# Patient Record
Sex: FEMALE | Race: WHITE | NOT HISPANIC OR LATINO | Employment: FULL TIME | URBAN - METROPOLITAN AREA
[De-identification: names, ages, dates, MRNs, and addresses within clinical notes are randomized per-mention and may not be internally consistent; named-entity substitution may affect disease eponyms.]

---

## 2019-03-25 ENCOUNTER — APPOINTMENT (OUTPATIENT)
Dept: RADIOLOGY | Facility: MEDICAL CENTER | Age: 34
End: 2019-03-25
Attending: EMERGENCY MEDICINE
Payer: COMMERCIAL

## 2019-03-25 ENCOUNTER — HOSPITAL ENCOUNTER (EMERGENCY)
Facility: MEDICAL CENTER | Age: 34
End: 2019-03-25
Attending: EMERGENCY MEDICINE
Payer: COMMERCIAL

## 2019-03-25 VITALS
OXYGEN SATURATION: 98 % | HEART RATE: 84 BPM | RESPIRATION RATE: 16 BRPM | WEIGHT: 250.22 LBS | TEMPERATURE: 98.4 F | DIASTOLIC BLOOD PRESSURE: 70 MMHG | SYSTOLIC BLOOD PRESSURE: 125 MMHG | BODY MASS INDEX: 39.19 KG/M2

## 2019-03-25 DIAGNOSIS — R51.9 ACUTE INTRACTABLE HEADACHE, UNSPECIFIED HEADACHE TYPE: ICD-10-CM

## 2019-03-25 PROCEDURE — 99284 EMERGENCY DEPT VISIT MOD MDM: CPT

## 2019-03-25 PROCEDURE — 70450 CT HEAD/BRAIN W/O DYE: CPT

## 2019-03-25 PROCEDURE — 96374 THER/PROPH/DIAG INJ IV PUSH: CPT

## 2019-03-25 PROCEDURE — 96375 TX/PRO/DX INJ NEW DRUG ADDON: CPT

## 2019-03-25 PROCEDURE — 700111 HCHG RX REV CODE 636 W/ 250 OVERRIDE (IP): Performed by: EMERGENCY MEDICINE

## 2019-03-25 RX ORDER — DIPHENHYDRAMINE HYDROCHLORIDE 50 MG/ML
12.5 INJECTION INTRAMUSCULAR; INTRAVENOUS ONCE
Status: COMPLETED | OUTPATIENT
Start: 2019-03-25 | End: 2019-03-25

## 2019-03-25 RX ORDER — PROMETHAZINE HYDROCHLORIDE 25 MG/1
25 SUPPOSITORY RECTAL EVERY 6 HOURS PRN
Qty: 15 SUPPOSITORY | Refills: 0 | Status: SHIPPED | OUTPATIENT
Start: 2019-03-25 | End: 2021-05-24

## 2019-03-25 RX ORDER — METOCLOPRAMIDE HYDROCHLORIDE 5 MG/ML
10 INJECTION INTRAMUSCULAR; INTRAVENOUS ONCE
Status: COMPLETED | OUTPATIENT
Start: 2019-03-25 | End: 2019-03-25

## 2019-03-25 RX ORDER — MECLIZINE HYDROCHLORIDE 25 MG/1
25 TABLET ORAL 3 TIMES DAILY PRN
Qty: 30 TAB | Refills: 0 | Status: SHIPPED | OUTPATIENT
Start: 2019-03-25 | End: 2021-05-24

## 2019-03-25 RX ORDER — KETOROLAC TROMETHAMINE 30 MG/ML
30 INJECTION, SOLUTION INTRAMUSCULAR; INTRAVENOUS ONCE
Status: COMPLETED | OUTPATIENT
Start: 2019-03-25 | End: 2019-03-25

## 2019-03-25 RX ADMIN — KETOROLAC TROMETHAMINE 30 MG: 30 INJECTION, SOLUTION INTRAMUSCULAR; INTRAVENOUS at 12:15

## 2019-03-25 RX ADMIN — DIPHENHYDRAMINE HYDROCHLORIDE 12.5 MG: 50 INJECTION INTRAMUSCULAR; INTRAVENOUS at 12:15

## 2019-03-25 RX ADMIN — METOCLOPRAMIDE 10 MG: 5 INJECTION, SOLUTION INTRAMUSCULAR; INTRAVENOUS at 12:16

## 2019-03-25 ASSESSMENT — LIFESTYLE VARIABLES: DO YOU DRINK ALCOHOL: NO

## 2019-03-25 NOTE — ED PROVIDER NOTES
ED Provider Note    Scribed for Surinder Lema M.D. by Germain Self. 3/25/2019  11:58 AM    Primary care provider: Neo Basilio M.D.  Means of arrival: Private Vehicle.  History obtained from: Patient  History limited by: None    CHIEF COMPLAINT  Chief Complaint   Patient presents with   • Headache   • Nausea   • Dizziness     HPI  Mirella Gotti is a 33 y.o. female who presents to the Emergency Department complaining of a headache onset 2-3 weeks ago.  There is been no photophobia but she has had some dizziness and vertiginous symptoms.  The patient has associated nausea and dizziness that began 1-2 days ago.  Previously she was able to manage the headache with Excedrin.  Has not had a head scan in about 13 years but has had no headaches this severe denies any neck stiffness or vomiting.    REVIEW OF SYSTEMS  Pertinent negatives include no trauma. As above, all other systems reviewed and are negative.   See HPI for further details.     PAST MEDICAL HISTORY    None noted.    SURGICAL HISTORY   has a past surgical history that includes silverio by laparoscopy (2009).    SOCIAL HISTORY  Social History   Substance Use Topics   • Smoking status: Former Smoker   • Smokeless tobacco: Never Used   • Alcohol use No      History   Drug Use No     FAMILY HISTORY  Family History   Problem Relation Age of Onset   • Arthritis Mother         RA   • Cancer Father         skin    • Cancer Maternal Aunt         intestinal   • Cancer Maternal Grandmother         breast   • Cancer Other         espophageal    • Heart Disease Other      CURRENT MEDICATIONS  Home Medications    **Home medications have not yet been reviewed for this encounter**       ALLERGIES  Allergies   Allergen Reactions   • Bee    • No Known Drug Allergy        PHYSICAL EXAM  VITAL SIGNS: /82   Pulse 85   Temp 36.9 °C (98.4 °F) (Temporal)   Resp 16   Wt 113.5 kg (250 lb 3.6 oz)   LMP 03/22/2019   SpO2 98%   Breastfeeding? Unknown   BMI 39.19  kg/m²   Constitutional: Well developed, Well nourished, No acute distress, Non-toxic appearance.   HENT: Normocephalic, Atraumatic, Bilateral external ears normal, Oropharynx is clear mucous membranes are moist. No oral exudates or nasal discharge.   Eyes: Pupils are equal round and reactive, EOMI, Conjunctiva normal, No discharge.   Neck: Normal range of motion, No tenderness, Supple, No stridor. No meningismus.  Lymphatic: No lymphadenopathy noted.   Cardiovascular: Regular rate and rhythm without murmur rub or gallop.  Thorax & Lungs: Clear breath sounds bilaterally without wheezes, rhonchi or rales. There is no chest wall tenderness.   Abdomen: Soft non-tender non-distended. There is no rebound or guarding. No organomegaly is appreciated. Bowel sounds are normal.  Skin: Normal without rash.   Back: No CVA or spinal tenderness.   Extremities: Intact distal pulses, No edema, No tenderness, No cyanosis, No clubbing. Capillary refill is less than 2 seconds.  Musculoskeletal: Good range of motion in all major joints. No tenderness to palpation or major deformities noted.   Neurologic: Alert & oriented x 3, Normal motor function, Normal sensory function, No focal deficits noted. Reflexes are normal.  Psychiatric: Affect normal, Judgment normal, Mood normal. There is no suicidal ideation or patient reported hallucinations.     DIAGNOSTIC STUDIES / PROCEDURES    RADIOLOGY  CT-HEAD W/O   Final Result      Negative noncontrast CT scan of the head / brain.        The radiologist's interpretation of all radiological studies have been reviewed by me.    COURSE & MEDICAL DECISION MAKING  Nursing notes, VS, PMSFHx reviewed in chart.    11:58 AM Patient seen and examined at bedside. Ordered for CT head to evaluate. Patient was treated with 12.5 mg Benadryl, 30 mg Toradol, and 10 mg Reglan for her symptoms.  Patient agrees to proceed with the discussed plan of care.    I had a low suspicion for meningitis or subarachnoid  hemorrhage.  I was concerned about the fact that this is an unusual headache and the where she is ever had in the last 10 years.  Was concerned about possible mass and CT scan of the head shows no evidence of mass, bleed or swelling    Patient improved significantly with Benadryl, Toradol and Reglan.  She seems much better and I am discharging her with meclizine as well as Phenergan suppositories to be used as needed.  She will return of any significant change in symptoms    Patient has had high blood pressure while in the emergency department, felt likely secondary to medical condition. Counseled patient to monitor blood pressure at home and follow up with primary care physician.      The patient will return for new or worsening symptoms and is stable at the time of discharge.    Medications   diphenhydrAMINE (BENADRYL) injection 12.5 mg (12.5 mg Intravenous Given 3/25/19 1215)   ketorolac (TORADOL) injection 30 mg (30 mg Intravenous Given 3/25/19 1215)   metoclopramide (REGLAN) injection 10 mg (10 mg Intravenous Given 3/25/19 1216)      DISPOSITION:  Patient will be discharged home in stable condition.    FINAL IMPRESSION  1. Acute intractable headache, unspecified headache type          I, Germain Self (Scribe), am scribing for, and in the presence of, Surinder Lema M.D..    Electronically signed by: Germain Self (Scribe), 3/25/2019    ISurinder M.D. personally performed the services described in this documentation, as scribed by Germain Self in my presence, and it is both accurate and complete.  E.    The note accurately reflects work and decisions made by me.  Surinder Lema  3/25/2019  2:05 PM

## 2019-03-25 NOTE — ED TRIAGE NOTES
34 y/o female ambulatory to triage with c/o headache, nausea and dizziness. Pt states the headache began 2-3 weeks ago but she has been able to control the pain with Excedrin. Pt states that over the weekend she began to have nausea and experience dizziness.

## 2020-11-17 ENCOUNTER — HOSPITAL ENCOUNTER (OUTPATIENT)
Dept: LAB | Facility: MEDICAL CENTER | Age: 35
End: 2020-11-17
Attending: PHYSICIAN ASSISTANT
Payer: COMMERCIAL

## 2020-11-17 PROCEDURE — 87491 CHLMYD TRACH DNA AMP PROBE: CPT

## 2020-11-17 PROCEDURE — 87591 N.GONORRHOEAE DNA AMP PROB: CPT

## 2020-11-17 PROCEDURE — 88175 CYTOPATH C/V AUTO FLUID REDO: CPT

## 2020-11-17 PROCEDURE — 87624 HPV HI-RISK TYP POOLED RSLT: CPT

## 2020-11-20 LAB
C TRACH DNA GENITAL QL NAA+PROBE: NEGATIVE
CYTOLOGY REG CYTOL: NORMAL
HPV HR 12 DNA CVX QL NAA+PROBE: NEGATIVE
HPV16 DNA SPEC QL NAA+PROBE: NEGATIVE
HPV18 DNA SPEC QL NAA+PROBE: NEGATIVE
N GONORRHOEA DNA GENITAL QL NAA+PROBE: NEGATIVE
SPECIMEN SOURCE: NORMAL
SPECIMEN SOURCE: NORMAL

## 2021-05-24 ENCOUNTER — OFFICE VISIT (OUTPATIENT)
Dept: URGENT CARE | Facility: CLINIC | Age: 36
End: 2021-05-24
Payer: COMMERCIAL

## 2021-05-24 VITALS
TEMPERATURE: 98.9 F | HEIGHT: 67 IN | OXYGEN SATURATION: 99 % | SYSTOLIC BLOOD PRESSURE: 124 MMHG | DIASTOLIC BLOOD PRESSURE: 72 MMHG | BODY MASS INDEX: 40.97 KG/M2 | WEIGHT: 261 LBS | HEART RATE: 70 BPM | RESPIRATION RATE: 17 BRPM

## 2021-05-24 DIAGNOSIS — Z87.39 HISTORY OF BACK PAIN: ICD-10-CM

## 2021-05-24 DIAGNOSIS — M62.830 LUMBAR PARASPINAL MUSCLE SPASM: ICD-10-CM

## 2021-05-24 PROCEDURE — 99204 OFFICE O/P NEW MOD 45 MIN: CPT | Performed by: PHYSICIAN ASSISTANT

## 2021-05-24 RX ORDER — CYCLOBENZAPRINE HCL 10 MG
10 TABLET ORAL 3 TIMES DAILY PRN
Qty: 30 TABLET | Refills: 0 | Status: SHIPPED | OUTPATIENT
Start: 2021-05-24 | End: 2021-11-02

## 2021-05-24 RX ORDER — KETOROLAC TROMETHAMINE 30 MG/ML
30 INJECTION, SOLUTION INTRAMUSCULAR; INTRAVENOUS ONCE
Status: COMPLETED | OUTPATIENT
Start: 2021-05-24 | End: 2021-05-24

## 2021-05-24 RX ORDER — NAPROXEN 500 MG/1
500 TABLET ORAL 2 TIMES DAILY WITH MEALS
Qty: 60 TABLET | Refills: 0 | Status: SHIPPED | OUTPATIENT
Start: 2021-05-24 | End: 2021-11-02

## 2021-05-24 RX ORDER — NORETHINDRONE ACETATE AND ETHINYL ESTRADIOL, ETHINYL ESTRADIOL AND FERROUS FUMARATE 1MG-10(24)
KIT ORAL
COMMUNITY
Start: 2021-03-09 | End: 2021-11-02

## 2021-05-24 RX ADMIN — KETOROLAC TROMETHAMINE 30 MG: 30 INJECTION, SOLUTION INTRAMUSCULAR; INTRAVENOUS at 11:32

## 2021-05-24 ASSESSMENT — ENCOUNTER SYMPTOMS
FEVER: 0
CHILLS: 0
NAUSEA: 0
TINGLING: 0
FOCAL WEAKNESS: 0
BACK PAIN: 1
VOMITING: 0

## 2021-05-24 NOTE — PROGRESS NOTES
Subjective:     Mirella Gotti  is a 35 y.o. female who presents for Back Pain (lower back pain x  2 days , feels like a pinched nerve , hurts to walk or make sudden movement  , right side hip and right lower back )      Back Pain  Pertinent negatives include no fever or tingling.   Patient presents urgent care complaining of pain in the low back.  Patient notes right-sided low back pain as she got up from sitting on the floor at a basketball game on Saturday, 2 days ago.  She notes some mild improvement through the day on Saturday and then worsened pain that evening.  She complained of pain with ambulation.  She did try some Tylenol and Aleve with moderate improvement through the night allowing for good sleep.  Upon waking yesterday morning she had continued right side pain.  She notes mild radiation of discomfort wrapping around right hip.  Denies significant radiation down right leg.  Notes past medical history of similar pain historically.  She notes she did have an MRI for low back pain in 2005.  She states she has had few episodes of similar low back pain but denies history of trauma to the low back or surgery.  She notes family history of some low back arthritis requiring treatments.  She denies numbness tingling or weakness.  Denies saddle anesthesia or incontinence.  She denies fevers chills or rash.  She complains of continued pain but notes some improvement following Tylenol and Aleve.    Review of Systems   Constitutional: Negative for chills and fever.   Gastrointestinal: Negative for nausea and vomiting.   Musculoskeletal: Positive for back pain.   Skin: Negative for rash.   Neurological: Negative for tingling and focal weakness.       Medications:    • BEYAZ PO  • cyclobenzaprine  • fluticasone  • HYDROcodone-Acetaminophen Tabs  • ibuprofen Tabs  • meclizine Tabs  • promethazine Supp    Allergies: Bee and No known drug allergy    Problem List: Mirella Gotti has Labor and delivery  "indication for care or intervention on their problem list.    Surgical History:  Past Surgical History:   Procedure Laterality Date   • FIDE BY LAPAROSCOPY  2009       Past Social Hx: Mirella Gotti  reports that she has quit smoking. She has never used smokeless tobacco. She reports that she does not drink alcohol and does not use drugs.     Past Family Hx:  Mirella Gotti family history includes Arthritis in her mother; Cancer in her father, maternal aunt, maternal grandmother, and another family member; Heart Disease in an other family member.     Problem list, medications, and allergies reviewed by myself today in Epic.     Objective:   /72 (BP Location: Left arm, Patient Position: Sitting, BP Cuff Size: Adult)   Pulse 70   Temp 37.2 °C (98.9 °F) (Temporal)   Resp 17   Ht 1.702 m (5' 7\")   Wt 118 kg (261 lb)   SpO2 99%   BMI 40.88 kg/m²     Physical Exam  Vitals and nursing note reviewed.   Constitutional:       General: She is not in acute distress.     Appearance: She is well-developed. She is not diaphoretic.   HENT:      Head: Normocephalic and atraumatic.      Right Ear: External ear normal.      Left Ear: External ear normal.      Nose: Nose normal.   Eyes:      General: Lids are normal. No scleral icterus.        Right eye: No discharge.         Left eye: No discharge.      Conjunctiva/sclera: Conjunctivae normal.   Pulmonary:      Effort: Pulmonary effort is normal. No respiratory distress.   Musculoskeletal:      Cervical back: Neck supple.      Lumbar back: Spasms and tenderness ( primary paraspinous) present. No swelling, edema, deformity, lacerations or bony tenderness. Decreased range of motion ( 2/2 pain).   Skin:     General: Skin is warm and dry.      Coloration: Skin is not pale.      Findings: No erythema.   Neurological:      Mental Status: She is alert and oriented to person, place, and time. She is not disoriented.      Sensory: No sensory deficit.      Coordination: " Coordination normal.      Deep Tendon Reflexes: Reflexes are normal and symmetric.      Comments: SLR normal bilat   Psychiatric:         Speech: Speech normal.         Behavior: Behavior normal.     Toradol 30 mg IM-tolerates well    Assessment/Plan:   Assessment      1. Lumbar paraspinal muscle spasm  - ketorolac (TORADOL) injection 30 mg  - naproxen (NAPROSYN) 500 MG Tab; Take 1 tablet by mouth 2 times a day with meals.  Dispense: 60 tablet; Refill: 0  - cyclobenzaprine (FLEXERIL) 10 mg Tab; Take 1 tablet by mouth 3 times a day as needed.  Dispense: 30 tablet; Refill: 0    2. History of back pain  Recommend conservative care, rest, ice, heat, work on gentle ROM exercises, sent w/ stretches  Return to clinic with lack of resolution or progression of symptoms.  Cautioned regarding potential for sedation with medication.      I have worn an N95 mask, gloves and eye protection for the entire encounter with this patient.     Differential diagnosis, natural history, supportive care, and indications for immediate follow-up discussed.

## 2021-11-23 ENCOUNTER — HOSPITAL ENCOUNTER (OUTPATIENT)
Dept: LAB | Facility: MEDICAL CENTER | Age: 36
End: 2021-11-23
Attending: OBSTETRICS & GYNECOLOGY
Payer: COMMERCIAL

## 2021-11-23 LAB — CYTOLOGY REG CYTOL: NORMAL

## 2021-11-23 PROCEDURE — 88175 CYTOPATH C/V AUTO FLUID REDO: CPT

## 2022-01-14 ENCOUNTER — HOSPITAL ENCOUNTER (OUTPATIENT)
Dept: RADIOLOGY | Facility: MEDICAL CENTER | Age: 37
End: 2022-01-14
Attending: OBSTETRICS & GYNECOLOGY
Payer: COMMERCIAL

## 2022-01-14 DIAGNOSIS — N92.4 PREMENOPAUSAL MENORRHAGIA: ICD-10-CM

## 2022-01-14 PROCEDURE — 76830 TRANSVAGINAL US NON-OB: CPT

## 2022-02-08 ENCOUNTER — PRE-ADMISSION TESTING (OUTPATIENT)
Dept: ADMISSIONS | Facility: MEDICAL CENTER | Age: 37
End: 2022-02-08
Attending: OBSTETRICS & GYNECOLOGY
Payer: COMMERCIAL

## 2022-02-08 DIAGNOSIS — Z01.812 PRE-OPERATIVE LABORATORY EXAMINATION: ICD-10-CM

## 2022-02-08 LAB
ANION GAP SERPL CALC-SCNC: 11 MMOL/L (ref 7–16)
BUN SERPL-MCNC: 11 MG/DL (ref 8–22)
CALCIUM SERPL-MCNC: 9.8 MG/DL (ref 8.5–10.5)
CHLORIDE SERPL-SCNC: 107 MMOL/L (ref 96–112)
CO2 SERPL-SCNC: 19 MMOL/L (ref 20–33)
CREAT SERPL-MCNC: 0.69 MG/DL (ref 0.5–1.4)
ERYTHROCYTE [DISTWIDTH] IN BLOOD BY AUTOMATED COUNT: 42.3 FL (ref 35.9–50)
GLUCOSE SERPL-MCNC: 86 MG/DL (ref 65–99)
HCG SERPL QL: NEGATIVE
HCT VFR BLD AUTO: 43.5 % (ref 37–47)
HGB BLD-MCNC: 14.3 G/DL (ref 12–16)
MCH RBC QN AUTO: 28.9 PG (ref 27–33)
MCHC RBC AUTO-ENTMCNC: 32.9 G/DL (ref 33.6–35)
MCV RBC AUTO: 87.9 FL (ref 81.4–97.8)
PLATELET # BLD AUTO: 354 K/UL (ref 164–446)
PMV BLD AUTO: 10.2 FL (ref 9–12.9)
POTASSIUM SERPL-SCNC: 4 MMOL/L (ref 3.6–5.5)
RBC # BLD AUTO: 4.95 M/UL (ref 4.2–5.4)
SODIUM SERPL-SCNC: 137 MMOL/L (ref 135–145)
WBC # BLD AUTO: 11.6 K/UL (ref 4.8–10.8)

## 2022-02-08 PROCEDURE — 36415 COLL VENOUS BLD VENIPUNCTURE: CPT

## 2022-02-08 PROCEDURE — 80048 BASIC METABOLIC PNL TOTAL CA: CPT

## 2022-02-08 PROCEDURE — 85027 COMPLETE CBC AUTOMATED: CPT

## 2022-02-08 PROCEDURE — 84703 CHORIONIC GONADOTROPIN ASSAY: CPT

## 2022-02-08 RX ORDER — IBUPROFEN 800 MG/1
800 TABLET ORAL PRN
COMMUNITY

## 2022-02-17 ENCOUNTER — HOSPITAL ENCOUNTER (OUTPATIENT)
Dept: LAB | Facility: MEDICAL CENTER | Age: 37
End: 2022-02-17
Attending: OBSTETRICS & GYNECOLOGY
Payer: COMMERCIAL

## 2022-02-17 LAB — PATHOLOGY CONSULT NOTE: NORMAL

## 2022-02-17 PROCEDURE — 88305 TISSUE EXAM BY PATHOLOGIST: CPT

## 2022-02-21 ENCOUNTER — ANESTHESIA EVENT (OUTPATIENT)
Dept: SURGERY | Facility: MEDICAL CENTER | Age: 37
End: 2022-02-21
Payer: COMMERCIAL

## 2022-02-22 ENCOUNTER — HOSPITAL ENCOUNTER (OUTPATIENT)
Facility: MEDICAL CENTER | Age: 37
End: 2022-02-22
Attending: OBSTETRICS & GYNECOLOGY | Admitting: OBSTETRICS & GYNECOLOGY
Payer: COMMERCIAL

## 2022-02-22 ENCOUNTER — ANESTHESIA (OUTPATIENT)
Dept: SURGERY | Facility: MEDICAL CENTER | Age: 37
End: 2022-02-22
Payer: COMMERCIAL

## 2022-02-22 VITALS
OXYGEN SATURATION: 98 % | TEMPERATURE: 99.3 F | HEART RATE: 80 BPM | HEIGHT: 67 IN | SYSTOLIC BLOOD PRESSURE: 140 MMHG | DIASTOLIC BLOOD PRESSURE: 58 MMHG | RESPIRATION RATE: 16 BRPM | BODY MASS INDEX: 40 KG/M2 | WEIGHT: 254.85 LBS

## 2022-02-22 PROBLEM — Z30.2 REQUEST FOR STERILIZATION: Status: ACTIVE | Noted: 2022-02-22

## 2022-02-22 PROBLEM — E66.9 OBESITY (BMI 30-39.9): Status: ACTIVE | Noted: 2022-02-22

## 2022-02-22 LAB
EXTERNAL QUALITY CONTROL: NORMAL
HCG UR QL: NEGATIVE
PATHOLOGY CONSULT NOTE: NORMAL
SARS-COV+SARS-COV-2 AG RESP QL IA.RAPID: NEGATIVE

## 2022-02-22 PROCEDURE — 160002 HCHG RECOVERY MINUTES (STAT): Performed by: OBSTETRICS & GYNECOLOGY

## 2022-02-22 PROCEDURE — 700102 HCHG RX REV CODE 250 W/ 637 OVERRIDE(OP): Performed by: ANESTHESIOLOGY

## 2022-02-22 PROCEDURE — 160035 HCHG PACU - 1ST 60 MINS PHASE I: Performed by: OBSTETRICS & GYNECOLOGY

## 2022-02-22 PROCEDURE — A9270 NON-COVERED ITEM OR SERVICE: HCPCS | Performed by: ANESTHESIOLOGY

## 2022-02-22 PROCEDURE — 501582 HCHG TROCAR, THRD BLADED: Performed by: OBSTETRICS & GYNECOLOGY

## 2022-02-22 PROCEDURE — 501330 HCHG SET, CYSTO IRRIG TUBING: Performed by: OBSTETRICS & GYNECOLOGY

## 2022-02-22 PROCEDURE — 700105 HCHG RX REV CODE 258: Performed by: OBSTETRICS & GYNECOLOGY

## 2022-02-22 PROCEDURE — 700102 HCHG RX REV CODE 250 W/ 637 OVERRIDE(OP): Performed by: OBSTETRICS & GYNECOLOGY

## 2022-02-22 PROCEDURE — 81025 URINE PREGNANCY TEST: CPT

## 2022-02-22 PROCEDURE — 160029 HCHG SURGERY MINUTES - 1ST 30 MINS LEVEL 4: Performed by: OBSTETRICS & GYNECOLOGY

## 2022-02-22 PROCEDURE — 700101 HCHG RX REV CODE 250: Performed by: OBSTETRICS & GYNECOLOGY

## 2022-02-22 PROCEDURE — 500886 HCHG PACK, LAPAROSCOPY: Performed by: OBSTETRICS & GYNECOLOGY

## 2022-02-22 PROCEDURE — 160009 HCHG ANES TIME/MIN: Performed by: OBSTETRICS & GYNECOLOGY

## 2022-02-22 PROCEDURE — 88302 TISSUE EXAM BY PATHOLOGIST: CPT

## 2022-02-22 PROCEDURE — 160046 HCHG PACU - 1ST 60 MINS PHASE II: Performed by: OBSTETRICS & GYNECOLOGY

## 2022-02-22 PROCEDURE — 160041 HCHG SURGERY MINUTES - EA ADDL 1 MIN LEVEL 4: Performed by: OBSTETRICS & GYNECOLOGY

## 2022-02-22 PROCEDURE — 700101 HCHG RX REV CODE 250: Performed by: ANESTHESIOLOGY

## 2022-02-22 PROCEDURE — 500002 HCHG ADHESIVE, DERMABOND: Performed by: OBSTETRICS & GYNECOLOGY

## 2022-02-22 PROCEDURE — 160048 HCHG OR STATISTICAL LEVEL 1-5: Performed by: OBSTETRICS & GYNECOLOGY

## 2022-02-22 PROCEDURE — 502587 HCHG PACK, D&C: Performed by: OBSTETRICS & GYNECOLOGY

## 2022-02-22 PROCEDURE — 87426 SARSCOV CORONAVIRUS AG IA: CPT | Performed by: OBSTETRICS & GYNECOLOGY

## 2022-02-22 PROCEDURE — 160025 RECOVERY II MINUTES (STATS): Performed by: OBSTETRICS & GYNECOLOGY

## 2022-02-22 PROCEDURE — A9270 NON-COVERED ITEM OR SERVICE: HCPCS | Performed by: OBSTETRICS & GYNECOLOGY

## 2022-02-22 PROCEDURE — 500868 HCHG NEEDLE, SURGI(VARES): Performed by: OBSTETRICS & GYNECOLOGY

## 2022-02-22 PROCEDURE — 501838 HCHG SUTURE GENERAL: Performed by: OBSTETRICS & GYNECOLOGY

## 2022-02-22 PROCEDURE — 700111 HCHG RX REV CODE 636 W/ 250 OVERRIDE (IP): Performed by: ANESTHESIOLOGY

## 2022-02-22 RX ORDER — HYDROMORPHONE HYDROCHLORIDE 1 MG/ML
0.1 INJECTION, SOLUTION INTRAMUSCULAR; INTRAVENOUS; SUBCUTANEOUS
Status: DISCONTINUED | OUTPATIENT
Start: 2022-02-22 | End: 2022-02-22 | Stop reason: HOSPADM

## 2022-02-22 RX ORDER — MIDAZOLAM HYDROCHLORIDE 1 MG/ML
1 INJECTION INTRAMUSCULAR; INTRAVENOUS
Status: DISCONTINUED | OUTPATIENT
Start: 2022-02-22 | End: 2022-02-22 | Stop reason: HOSPADM

## 2022-02-22 RX ORDER — PROMETHAZINE HYDROCHLORIDE 25 MG/1
12.5 SUPPOSITORY RECTAL EVERY 4 HOURS PRN
Status: DISCONTINUED | OUTPATIENT
Start: 2022-02-22 | End: 2022-02-22 | Stop reason: HOSPADM

## 2022-02-22 RX ORDER — ACETAMINOPHEN 500 MG
1000 TABLET ORAL ONCE
Status: COMPLETED | OUTPATIENT
Start: 2022-02-22 | End: 2022-02-22

## 2022-02-22 RX ORDER — DIPHENHYDRAMINE HYDROCHLORIDE 50 MG/ML
12.5 INJECTION INTRAMUSCULAR; INTRAVENOUS
Status: DISCONTINUED | OUTPATIENT
Start: 2022-02-22 | End: 2022-02-22 | Stop reason: HOSPADM

## 2022-02-22 RX ORDER — OXYCODONE HCL 5 MG/5 ML
5 SOLUTION, ORAL ORAL
Status: COMPLETED | OUTPATIENT
Start: 2022-02-22 | End: 2022-02-22

## 2022-02-22 RX ORDER — HYDRALAZINE HYDROCHLORIDE 20 MG/ML
5 INJECTION INTRAMUSCULAR; INTRAVENOUS
Status: DISCONTINUED | OUTPATIENT
Start: 2022-02-22 | End: 2022-02-22 | Stop reason: HOSPADM

## 2022-02-22 RX ORDER — CEFAZOLIN SODIUM 1 G/3ML
INJECTION, POWDER, FOR SOLUTION INTRAMUSCULAR; INTRAVENOUS PRN
Status: DISCONTINUED | OUTPATIENT
Start: 2022-02-22 | End: 2022-02-22 | Stop reason: SURG

## 2022-02-22 RX ORDER — OXYCODONE HCL 5 MG/5 ML
10 SOLUTION, ORAL ORAL
Status: COMPLETED | OUTPATIENT
Start: 2022-02-22 | End: 2022-02-22

## 2022-02-22 RX ORDER — HYDROMORPHONE HYDROCHLORIDE 1 MG/ML
0.4 INJECTION, SOLUTION INTRAMUSCULAR; INTRAVENOUS; SUBCUTANEOUS
Status: DISCONTINUED | OUTPATIENT
Start: 2022-02-22 | End: 2022-02-22 | Stop reason: HOSPADM

## 2022-02-22 RX ORDER — ONDANSETRON 2 MG/ML
INJECTION INTRAMUSCULAR; INTRAVENOUS PRN
Status: DISCONTINUED | OUTPATIENT
Start: 2022-02-22 | End: 2022-02-22 | Stop reason: SURG

## 2022-02-22 RX ORDER — IBUPROFEN 200 MG
200-600 TABLET ORAL EVERY 6 HOURS PRN
Qty: 30 TABLET | COMMUNITY
Start: 2022-02-22

## 2022-02-22 RX ORDER — SODIUM CHLORIDE, SODIUM LACTATE, POTASSIUM CHLORIDE, CALCIUM CHLORIDE 600; 310; 30; 20 MG/100ML; MG/100ML; MG/100ML; MG/100ML
INJECTION, SOLUTION INTRAVENOUS CONTINUOUS
Status: DISCONTINUED | OUTPATIENT
Start: 2022-02-22 | End: 2022-02-22 | Stop reason: HOSPADM

## 2022-02-22 RX ORDER — MEPERIDINE HYDROCHLORIDE 25 MG/ML
12.5 INJECTION INTRAMUSCULAR; INTRAVENOUS; SUBCUTANEOUS
Status: DISCONTINUED | OUTPATIENT
Start: 2022-02-22 | End: 2022-02-22 | Stop reason: HOSPADM

## 2022-02-22 RX ORDER — ACETAMINOPHEN 500 MG
500-1000 TABLET ORAL EVERY 6 HOURS PRN
Qty: 30 TABLET | Refills: 0 | COMMUNITY
Start: 2022-02-22

## 2022-02-22 RX ORDER — KETOROLAC TROMETHAMINE 30 MG/ML
INJECTION, SOLUTION INTRAMUSCULAR; INTRAVENOUS PRN
Status: DISCONTINUED | OUTPATIENT
Start: 2022-02-22 | End: 2022-02-22 | Stop reason: SURG

## 2022-02-22 RX ORDER — SUCCINYLCHOLINE CHLORIDE 20 MG/ML
INJECTION INTRAMUSCULAR; INTRAVENOUS PRN
Status: DISCONTINUED | OUTPATIENT
Start: 2022-02-22 | End: 2022-02-22 | Stop reason: SURG

## 2022-02-22 RX ORDER — HALOPERIDOL 5 MG/ML
1 INJECTION INTRAMUSCULAR
Status: DISCONTINUED | OUTPATIENT
Start: 2022-02-22 | End: 2022-02-22 | Stop reason: HOSPADM

## 2022-02-22 RX ORDER — ROCURONIUM BROMIDE 10 MG/ML
INJECTION, SOLUTION INTRAVENOUS PRN
Status: DISCONTINUED | OUTPATIENT
Start: 2022-02-22 | End: 2022-02-22 | Stop reason: SURG

## 2022-02-22 RX ORDER — CELECOXIB 200 MG/1
400 CAPSULE ORAL
Status: COMPLETED | OUTPATIENT
Start: 2022-02-22 | End: 2022-02-22

## 2022-02-22 RX ORDER — BUPIVACAINE HYDROCHLORIDE AND EPINEPHRINE 2.5; 5 MG/ML; UG/ML
INJECTION, SOLUTION EPIDURAL; INFILTRATION; INTRACAUDAL; PERINEURAL
Status: DISCONTINUED | OUTPATIENT
Start: 2022-02-22 | End: 2022-02-22 | Stop reason: HOSPADM

## 2022-02-22 RX ORDER — GABAPENTIN 300 MG/1
300 CAPSULE ORAL ONCE
Status: COMPLETED | OUTPATIENT
Start: 2022-02-22 | End: 2022-02-22

## 2022-02-22 RX ORDER — HYDROMORPHONE HYDROCHLORIDE 1 MG/ML
0.2 INJECTION, SOLUTION INTRAMUSCULAR; INTRAVENOUS; SUBCUTANEOUS
Status: DISCONTINUED | OUTPATIENT
Start: 2022-02-22 | End: 2022-02-22 | Stop reason: HOSPADM

## 2022-02-22 RX ORDER — LABETALOL HYDROCHLORIDE 5 MG/ML
5 INJECTION, SOLUTION INTRAVENOUS
Status: DISCONTINUED | OUTPATIENT
Start: 2022-02-22 | End: 2022-02-22 | Stop reason: HOSPADM

## 2022-02-22 RX ORDER — EPINEPHRINE 1 MG/ML(1)
AMPUL (ML) INJECTION
Status: DISCONTINUED
Start: 2022-02-22 | End: 2022-02-22 | Stop reason: HOSPADM

## 2022-02-22 RX ORDER — DEXAMETHASONE SODIUM PHOSPHATE 4 MG/ML
INJECTION, SOLUTION INTRA-ARTICULAR; INTRALESIONAL; INTRAMUSCULAR; INTRAVENOUS; SOFT TISSUE PRN
Status: DISCONTINUED | OUTPATIENT
Start: 2022-02-22 | End: 2022-02-22 | Stop reason: SURG

## 2022-02-22 RX ORDER — BUPIVACAINE HYDROCHLORIDE 2.5 MG/ML
INJECTION, SOLUTION EPIDURAL; INFILTRATION; INTRACAUDAL
Status: DISCONTINUED
Start: 2022-02-22 | End: 2022-02-22 | Stop reason: HOSPADM

## 2022-02-22 RX ORDER — ONDANSETRON 2 MG/ML
4 INJECTION INTRAMUSCULAR; INTRAVENOUS
Status: DISCONTINUED | OUTPATIENT
Start: 2022-02-22 | End: 2022-02-22 | Stop reason: HOSPADM

## 2022-02-22 RX ORDER — HYDROMORPHONE HYDROCHLORIDE 2 MG/ML
INJECTION, SOLUTION INTRAMUSCULAR; INTRAVENOUS; SUBCUTANEOUS PRN
Status: DISCONTINUED | OUTPATIENT
Start: 2022-02-22 | End: 2022-02-22 | Stop reason: SURG

## 2022-02-22 RX ADMIN — CEFAZOLIN 2 G: 330 INJECTION, POWDER, FOR SOLUTION INTRAMUSCULAR; INTRAVENOUS at 07:41

## 2022-02-22 RX ADMIN — KETOROLAC TROMETHAMINE 30 MG: 30 INJECTION, SOLUTION INTRAMUSCULAR at 08:39

## 2022-02-22 RX ADMIN — SODIUM CHLORIDE, POTASSIUM CHLORIDE, SODIUM LACTATE AND CALCIUM CHLORIDE: 600; 310; 30; 20 INJECTION, SOLUTION INTRAVENOUS at 07:00

## 2022-02-22 RX ADMIN — PROPOFOL 200 MG: 10 INJECTION, EMULSION INTRAVENOUS at 07:39

## 2022-02-22 RX ADMIN — SUCCINYLCHOLINE CHLORIDE 120 MG: 20 INJECTION, SOLUTION INTRAMUSCULAR; INTRAVENOUS; PARENTERAL at 07:40

## 2022-02-22 RX ADMIN — ACETAMINOPHEN 1000 MG: 500 TABLET ORAL at 06:58

## 2022-02-22 RX ADMIN — HYDROMORPHONE HYDROCHLORIDE 1 MG: 2 INJECTION INTRAMUSCULAR; INTRAVENOUS; SUBCUTANEOUS at 07:42

## 2022-02-22 RX ADMIN — OXYCODONE HYDROCHLORIDE 5 MG: 5 SOLUTION ORAL at 09:11

## 2022-02-22 RX ADMIN — FENTANYL CITRATE 100 MCG: 50 INJECTION, SOLUTION INTRAMUSCULAR; INTRAVENOUS at 07:39

## 2022-02-22 RX ADMIN — GABAPENTIN 300 MG: 300 CAPSULE ORAL at 06:58

## 2022-02-22 RX ADMIN — SUGAMMADEX 200 MG: 100 INJECTION, SOLUTION INTRAVENOUS at 08:21

## 2022-02-22 RX ADMIN — CELECOXIB 400 MG: 200 CAPSULE ORAL at 06:59

## 2022-02-22 RX ADMIN — DEXAMETHASONE SODIUM PHOSPHATE 4 MG: 4 INJECTION, SOLUTION INTRA-ARTICULAR; INTRALESIONAL; INTRAMUSCULAR; INTRAVENOUS; SOFT TISSUE at 07:41

## 2022-02-22 RX ADMIN — ONDANSETRON 4 MG: 2 INJECTION INTRAMUSCULAR; INTRAVENOUS at 07:49

## 2022-02-22 RX ADMIN — HYDROMORPHONE HYDROCHLORIDE 1 MG: 2 INJECTION INTRAMUSCULAR; INTRAVENOUS; SUBCUTANEOUS at 07:47

## 2022-02-22 RX ADMIN — ROCURONIUM BROMIDE 20 MG: 10 INJECTION, SOLUTION INTRAVENOUS at 07:43

## 2022-02-22 ASSESSMENT — PAIN SCALES - GENERAL: PAIN_LEVEL: 0

## 2022-02-22 ASSESSMENT — PAIN DESCRIPTION - PAIN TYPE
TYPE: SURGICAL PAIN

## 2022-02-22 NOTE — ANESTHESIA PREPROCEDURE EVALUATION
Case: [id number] Date/Time: 02/22/22 0715    Procedures:       LAPAROSCOPY      SALPINGECTOMY (Bilateral )      HYSTEROSCOPY, WITH ENDOMETRIAL RADIOFREQUENCY ABLATION - NOVASURE    Pre-op diagnosis: MENORRHAGIA, DESIRES STERILITY    Location: [facility] ROOM 21 / SURGERY SAME DAY [facility]    Surgeons: Kevin Brock M.D.          Relevant Problems   No relevant active problems       Physical Exam    Airway   Mallampati: I  TM distance: >3 FB  Neck ROM: full       Cardiovascular - normal exam  Rhythm: regular  Rate: normal  (-) murmur     Dental - normal exam           Pulmonary - normal exam  Breath sounds clear to auscultation     Abdominal   (+) obese     Neurological - normal exam                 Anesthesia Plan    ASA 2       Plan - general       Airway plan will be ETT          Induction: intravenous    Postoperative Plan: Postoperative administration of opioids is intended.    Pertinent diagnostic labs and testing reviewed    Informed Consent:    Anesthetic plan and risks discussed with patient.    Use of blood products discussed with: patient whom consented to blood products.

## 2022-02-22 NOTE — OR SURGEON
Immediate Post OP Note    PreOp Diagnosis:     Menorrhagia  Requests sterility      PostOp Diagnosis:     same      Procedure(s):  LAPAROSCOPY - Wound Class: Clean Contaminated  BILATERAL SALPINGECTOMY - Wound Class: Clean Contaminated  HYSTEROSCOPY, WITH ENDOMETRIAL RADIOFREQUENCY ABLATION - NOVASURE - Wound Class: Clean Contaminated    Surgeon(s):  Kevin Brock M.D.    Anesthesiologist/Type of Anesthesia:  Anesthesiologist: Chiki Ryan M.D./General    Surgical Staff:  Circulator: Lucy Palacios R.N.; Daysi Childs R.N.  Scrub Person: Andrew Ruiz    Specimens removed if any:  ID Type Source Tests Collected by Time Destination   A : Bilateral fallopian tubes Tissue Fallopian Tube PATHOLOGY SPECIMEN Kevin Brock M.D. 2/22/2022  8:18 AM        Estimated Blood Loss: 5 CC    Findings: normal pelvis, normal ,uterine cavity    Complications: none        2/22/2022 8:50 AM Kevin Brock M.D.

## 2022-02-22 NOTE — ANESTHESIA PROCEDURE NOTES
Airway    Date/Time: 2/22/2022 7:40 AM  Performed by: Chiki Ryan M.D.  Authorized by: Chiki Ryan M.D.     Location:  OR  Urgency:  Elective  Difficult Airway: No    Indications for Airway Management:  Anesthesia      Spontaneous Ventilation: absent    Sedation Level:  Deep  Preoxygenated: Yes    Patient Position:  Sniffing  Mask Difficulty Assessment:  1 - vent by mask  Final Airway Type:  Endotracheal airway  Final Endotracheal Airway:  ETT  Cuffed: Yes    Technique Used for Successful ETT Placement:  Direct laryngoscopy  Devices/Methods Used in Placement:  Intubating stylet    Insertion Site:  Oral  Blade Type:  [place]  Laryngoscope Blade/Videolaryngoscope Blade Size:  2  ETT Size (mm):  7.0  Measured from:  Teeth  ETT to Teeth (cm):  21  Placement Verified by: auscultation and capnometry    Cormack-Lehane Classification:  Grade I - full view of glottis  Number of Attempts at Approach:  1  Ventilation Between Attempts:  None  Number of Other Approaches Attempted:  0

## 2022-02-22 NOTE — OR NURSING
0853 Received pt from the OR with  and RN, jose pad dry.  VSS, in no signs of distress. Pt aware of POC.    0910 Pt complains of pain, medicated. Taking sips of water.    0925 Mother called, updated on POC and estimated time of departure, VSS.  Jose pad with scant amount of bloody drainage. Abdomen soft.    0955 Discharge instructions given to pt and mother, both verbalize understanding.   Pt meets discharge criteria. Able to dress and steady on feet.    10:00 Pt discharged, taken to car in  by CNA.  All questions/concerns addressed.

## 2022-02-22 NOTE — ANESTHESIA POSTPROCEDURE EVALUATION
Patient: Mirella Gotti    Procedure Summary     Date: 02/22/22 Room / Location: [facility] ROOM 21 / SURGERY SAME DAY [facility]    Anesthesia Start: 0735 Anesthesia Stop: 0856    Procedures:       LAPAROSCOPY (N/A Abdomen)      SALPINGECTOMY (Bilateral Abdomen)      HYSTEROSCOPY, WITH ENDOMETRIAL RADIOFREQUENCY ABLATION - NOVASURE (Bilateral Abdomen) Diagnosis: (MENORRHAGIA, DESIRES STERILITY)    Surgeons: Kevin Brock M.D. Responsible Provider: Chiki Ryan M.D.    Anesthesia Type: general ASA Status: 2          Final Anesthesia Type: general  Last vitals  BP   Blood Pressure: 133/71    Temp   37.4 °C (99.3 °F)    Pulse   85   Resp   14    SpO2   94 %      Anesthesia Post Evaluation    Patient location during evaluation: PACU  Patient participation: complete - patient participated  Level of consciousness: awake  Pain score: 0    Airway patency: patent  Anesthetic complications: no  Cardiovascular status: hemodynamically stable  Respiratory status: acceptable and face mask  Hydration status: euvolemic    PONV: none          No complications documented.     Nurse Pain Score: 0 (NPRS)

## 2022-02-22 NOTE — OP REPORT
DATE OF SERVICE:  2022     OPERATIONS:  1.  Laparoscopic bilateral salpingectomy.  2.  Diagnostic hysteroscopy.  3.  NovaSure radiofrequency endometrial ablation.     SURGEON:  Kevin Brock MD     ASSISTANT:  None.     ANESTHESIOLOGIST:  Chiki Ryan MD     ANESTHESIA:  General.     PREOPERATIVE DIAGNOSES:  1.  Menorrhagia.  2.  The patient requests permanent contraception.     POSTOPERATIVE DIAGNOSES:  1.  Menorrhagia.  2.  The patient requests permanent contraception.     COMPLICATIONS:  None.     ESTIMATED BLOOD LOSS:  5 mL.     PERTINENT ANATOMIC FINDINGS:  1.  Laparoscopically normal pelvis.  2.  Hysteroscopically normal uterine cavity.     BACKGROUND/CONSENT:  This 36-year-old lady is .  She came for elective   conservative surgical management of chronic menorrhagia, refractory to medical   therapy, as well as sterilization and cancer risk reduction via bilateral   salpingectomy.  Preoperative serum hCG was negative.     OPERATION:  The patient went to the OR.  General anesthesia was administered.    She was endotracheally intubated without complications.  We placed her lower   legs in padded Vladimir stirrups with her thighs slightly flexed.  Bimanual exam   under anesthesia revealed no pelvic masses.  Her abdomen, vagina, thighs, and   buttocks were prepped and draped.  Timeout was done.  I placed a weighted   speculum vaginally.  I applied traction to the anterior cervix with a   tenaculum.  I injected a paracervical block with a total of 10 mL 0.25%   Marcaine with epinephrine.  The uterus sounded to 8 cm.  I introduced an 8 cm   long LILIAN device into the uterus for uterine manipulation during the   laparoscopy.  The tenaculum was removed.     I injected 0.25% Marcaine with epinephrine subcutaneously beneath each   laparoscopy incision.  I made a 10 mm umbilical incision with a scalpel.  I   introduced a Veress needle intraperitoneally with no difficulty.  Carbon   dioxide was  insufflated.  The Veress needle was withdrawn.  I easily   introduced an 11 mm laparoscope trocar and sheath.  The pelvis, mid and upper   abdomen appeared laparoscopically normal.  There were no pelvic adhesions.  No   evidence of endometriosis.  Both fallopian tubes and ovaries appeared normal.     I made a 7 mm midline suprapubic incision with a scalpel and introduced a 7 mm   sheath with laparoscopic guidance.     Bilateral salpingectomy was accomplished using atraumatic grasping forceps and   the 5 mm diameter Applied vessel sealer/divider.  I meticulously sealed and   divided fibrous attachments between the fimbria and the ovaries on each side,   and then progressed up the mesosalpinx adjacent to the fallopian tubes,   sealing and dividing with the device.  Both fallopian tubes were sealed and   divided 5 mm from the uterus.  Both fallopian tube specimens were extracted   through the 11 mm sheath and sent to pathology.  There was no bleeding   encountered.  The abdomen was desufflated.  Both sheaths were removed.  I   closed the fascia beneath the umbilical incision with a figure-of-eight suture   of 0 Vicryl.  I closed both skin incisions with 4-0 Vicryl subcuticular   suture.  Dermabond wound adhesive was applied to both incisions.     Attention was turned to hysteroscopy.  I removed the LILIAN device and reapplied   a tenaculum anteriorly.  I gently dilated the internal os.  I meticulously   measured the length of the entire uterus at 8.0 cm.  The cervical length was   measured at 3.5 cm.  Thus, the length of the uterine corpus was calculated to   be 4.5 cm.     I attempted diagnostic hysteroscopy; the internal os was so tight that it   was preventing advancement of my hysteroscope past the internal os.  I did   get a view of the uterine cavity from the level of the internal os and it   appeared intact with no evidence of any submucous fibroids.     The NovaSure device was inserted to 8.0 cm.  The sheath was  retracted.  I   meticulously measured the width of the uterine cavity of 4.0 cm.     We entered length 4.5 cm and width 4.0 cm on the [employer] computer.  I tightly   applied the cervical collar.  The cavity assessment test passed on the first   attempt.  The machine was enabled and NovaSure radiofrequency endometrial   ablation was accomplished with a power of 99 griffith and a total ablating time   of 99 seconds.  The device was retracted back into the sheath and removed from   the patient.  Post-ablation, diagnostic hysteroscopy revealed a globally   ablated endometrial cavity, which distended appropriately with no evidence of   perforation.  The tenaculum was removed.  There was some bleeding from the   tenaculum site, controlled with a figure-of-eight suture of 2-0 Vicryl.    Sponge and needle counts were correct.  The patient is in the recovery room in   satisfactory condition.        ______________________________  MD LIANG Summers/RIK    DD:  02/22/2022 09:05  DT:  02/22/2022 10:29    Job#:  [id number]

## 2022-02-22 NOTE — DISCHARGE INSTRUCTIONS
ACTIVITY: Rest and take it easy for the first 24 hours.  A responsible adult is recommended to remain with you during that time.  It is normal to feel sleepy.  We encourage you to not do anything that requires balance, judgment or coordination.    MILD FLU-LIKE SYMPTOMS ARE NORMAL. YOU MAY EXPERIENCE GENERALIZED MUSCLE ACHES, THROAT IRRITATION, HEADACHE AND/OR SOME NAUSEA.    FOR 24 HOURS DO NOT:  Drive, operate machinery or run household appliances.  Drink beer or alcoholic beverages.   Make important decisions or sign legal documents.    SPECIAL INSTRUCTIONS: SEE INSTRUCTION SHEET. CALL THE DOCTOR FOR ANY CONCERNS. DRINK PLENTY OF FLUIDS.    DIET: To avoid nausea, slowly advance diet as tolerated, avoiding spicy or greasy foods for the first day.  Add more substantial food to your diet according to your physician's instructions.  Babies can be fed formula or breast milk as soon as they are hungry.  INCREASE FLUIDS AND FIBER TO AVOID CONSTIPATION.    SURGICAL DRESSING/BATHING: *MAY SHOWER TOMORROW. NO HOT TUBS, BATH TUBS, OR SWIMMING POOLS UNTIL FOLLOW UP APT.**    FOLLOW-UP APPOINTMENT:  A follow-up appointment should be arranged with your doctor in *march 18th**; call to schedule.    You should CALL YOUR PHYSICIAN if you develop:  Fever greater than 101 degrees F.  Pain not relieved by medication, or persistent nausea or vomiting.  Excessive bleeding (blood soaking through dressing) or unexpected drainage from the wound.  Extreme redness or swelling around the incision site, drainage of pus or foul smelling drainage.  Inability to urinate or empty your bladder within 8 hours.  Problems with breathing or chest pain.    You should call 911 if you develop problems with breathing or chest pain.  If you are unable to contact your doctor or surgical center, you should go to the nearest emergency room or urgent care center.  Physician's telephone #: *[phone]**    If any questions arise, call your doctor.   If your doctor is not available, please feel free to call the Surgical Center at [phone].     A registered nurse may call you a few days after your surgery to see how you are doing after your procedure.    MEDICATIONS: Resume taking daily medication.  Take prescribed pain medication with food.  If no medication is prescribed, you may take non-aspirin pain medication if needed.  PAIN MEDICATION CAN BE VERY CONSTIPATING.  Take a stool softener or laxative such as senokot, pericolace, or milk of magnesia if needed.    Prescription given for *none**.  Last pain medication given at **oxycodone @ 0910, toradol @ 0840 (ibuprofen 240pm), acetaminophen @ 0800 (may take next dose at 2pm)  *.    If your physician has prescribed pain medication that includes Acetaminophen (Tylenol), do not take additional Acetaminophen (Tylenol) while taking the prescribed medication.    Depression / Suicide Risk    As you are discharged from this [facility] facility, it is important to learn how to keep safe from harming yourself.    Recognize the warning signs:  · Abrupt changes in personality, positive or negative- including increase in energy   · Giving away possessions  · Change in eating patterns- significant weight changes-  positive or negative  · Change in sleeping patterns- unable to sleep or sleeping all the time   · Unwillingness or inability to communicate  · Depression  · Unusual sadness, discouragement and loneliness  · Talk of wanting to die  · Neglect of personal appearance   · Rebelliousness- reckless behavior  · Withdrawal from people/activities they love  · Confusion- inability to concentrate     If you or a loved one observes any of these behaviors or has concerns about self-harm, here's what you can do:  · Talk about it- your feelings and reasons for harming yourself  · Remove any means that you might use to hurt yourself (examples: pills, rope, extension cords, firearm)  · Get professional help from the community  (Mental Health, Substance Abuse, psychological counseling)  · Do not be alone:Call your Safe Contact- someone whom you trust who will be there for you.  · Call your local CRISIS HOTLINE 965-4296 or 997-023-7244  · Call your local Children's Mobile Crisis Response Team [place] (819) 988-8428 or www.[employer]  · Call the toll free National Suicide Prevention Hotlines   · National Suicide Prevention Lifeline 877-018-XMBG (3236)  · National Hope Line Network 800-SUICIDE (668-1937)

## 2022-02-22 NOTE — ANESTHESIA TIME REPORT
Anesthesia Start and Stop Event Times     Date Time Event    2/22/2022 0712 Ready for Procedure     0735 Anesthesia Start     0856 Anesthesia Stop        Responsible Staff  02/22/22    Name Role Begin End    August W ASHLEY Ryan Anesth 0735 0801        Preop Diagnosis (Free Text):  Pre-op Diagnosis     MENORRHAGIA, DESIRES STERILITY        Preop Diagnosis (Codes):    Premium Reason  Non-Premium    Comments:

## 2022-11-28 ENCOUNTER — HOSPITAL ENCOUNTER (OUTPATIENT)
Facility: MEDICAL CENTER | Age: 37
End: 2022-11-28
Attending: PHYSICIAN ASSISTANT
Payer: COMMERCIAL

## 2022-11-28 ENCOUNTER — HOSPITAL ENCOUNTER (OUTPATIENT)
Dept: LAB | Facility: MEDICAL CENTER | Age: 37
End: 2022-11-28
Attending: PHYSICIAN ASSISTANT

## 2022-11-28 PROCEDURE — 88175 CYTOPATH C/V AUTO FLUID REDO: CPT

## 2022-11-29 LAB — CYTOLOGY REG CYTOL: NORMAL

## 2023-12-03 ENCOUNTER — HOSPITAL ENCOUNTER (OUTPATIENT)
Dept: RADIOLOGY | Facility: MEDICAL CENTER | Age: 38
End: 2023-12-03
Attending: PHYSICIAN ASSISTANT
Payer: COMMERCIAL

## 2023-12-03 DIAGNOSIS — M54.16 RADICULOPATHY, LUMBAR REGION: ICD-10-CM

## 2023-12-03 DIAGNOSIS — R90.89 ABNORMAL FINDINGS ON DIAGNOSTIC IMAGING OF CENTRAL NERVOUS SYSTEM: ICD-10-CM

## 2023-12-03 PROCEDURE — A9579 GAD-BASE MR CONTRAST NOS,1ML: HCPCS | Performed by: PHYSICIAN ASSISTANT

## 2023-12-03 PROCEDURE — 700117 HCHG RX CONTRAST REV CODE 255: Performed by: PHYSICIAN ASSISTANT

## 2023-12-03 PROCEDURE — 72158 MRI LUMBAR SPINE W/O & W/DYE: CPT

## 2023-12-03 RX ADMIN — GADOTERIDOL 20 ML: 279.3 INJECTION, SOLUTION INTRAVENOUS at 08:50

## 2023-12-04 ENCOUNTER — HOSPITAL ENCOUNTER (OUTPATIENT)
Dept: RADIOLOGY | Facility: MEDICAL CENTER | Age: 38
End: 2023-12-04
Payer: COMMERCIAL

## 2024-02-02 ENCOUNTER — HOSPITAL ENCOUNTER (OUTPATIENT)
Dept: LAB | Facility: MEDICAL CENTER | Age: 39
End: 2024-02-02
Attending: PHYSICIAN ASSISTANT
Payer: COMMERCIAL

## 2024-02-02 PROCEDURE — 88175 CYTOPATH C/V AUTO FLUID REDO: CPT

## 2024-02-02 PROCEDURE — 87624 HPV HI-RISK TYP POOLED RSLT: CPT

## 2024-02-09 LAB
CYTOLOGIST CVX/VAG CYTO: NORMAL
CYTOLOGY CVX/VAG DOC CYTO: NORMAL
CYTOLOGY CVX/VAG DOC THIN PREP: NORMAL
HPV I/H RISK 4 DNA CVX QL PROBE+SIG AMP: NEGATIVE
NOTE NL11727A: NORMAL
OTHER STN SPEC: NORMAL
STAT OF ADQ CVX/VAG CYTO-IMP: NORMAL

## 2025-06-26 ENCOUNTER — HOSPITAL ENCOUNTER (OUTPATIENT)
Facility: MEDICAL CENTER | Age: 40
End: 2025-06-26
Attending: PHYSICIAN ASSISTANT
Payer: COMMERCIAL

## 2025-06-26 PROCEDURE — 87480 CANDIDA DNA DIR PROBE: CPT

## 2025-06-26 PROCEDURE — 87491 CHLMYD TRACH DNA AMP PROBE: CPT

## 2025-06-26 PROCEDURE — 87660 TRICHOMONAS VAGIN DIR PROBE: CPT

## 2025-06-26 PROCEDURE — 87510 GARDNER VAG DNA DIR PROBE: CPT

## 2025-06-26 PROCEDURE — 87591 N.GONORRHOEAE DNA AMP PROB: CPT

## 2025-06-27 LAB
C TRACH DNA GENITAL QL NAA+PROBE: NEGATIVE
CANDIDA DNA VAG QL PROBE+SIG AMP: NEGATIVE
G VAGINALIS DNA VAG QL PROBE+SIG AMP: POSITIVE
N GONORRHOEA DNA GENITAL QL NAA+PROBE: NEGATIVE
SPECIMEN SOURCE: NORMAL
T VAGINALIS DNA VAG QL PROBE+SIG AMP: NEGATIVE

## 2025-06-29 LAB
HSV1 DNA CSF QL NAA+PROBE: NOT DETECTED
HSV2 DNA CSF QL NAA+PROBE: NOT DETECTED
SPECIMEN SOURCE: NORMAL

## (undated) DEVICE — SUTURE GENERAL

## (undated) DEVICE — TUBING CLEARLINK DUO-VENT - C-FLO (48EA/CA)

## (undated) DEVICE — CANISTER SUCTION RIGID RED 1500CC (40EA/CA)

## (undated) DEVICE — TUBING OUTFLOW HYSTEROSCPY (10EA/BX)

## (undated) DEVICE — DRAPE UNDER BUTTOCKS FLUID - (20/CA)

## (undated) DEVICE — SYRINGE NON SAFETY 3 CC 21 GA X 1 1/2 IN (100/BX 8BX/CA)

## (undated) DEVICE — SODIUM CHL. IRRIGATION 0.9% 3000ML (4EA/CA 65CA/PF)

## (undated) DEVICE — LACTATED RINGERS INJ 1000 ML - (14EA/CA 60CA/PF)

## (undated) DEVICE — Device

## (undated) DEVICE — ELECTRODE 850 FOAM ADHESIVE - HYDROGEL RADIOTRNSPRNT (50/PK)

## (undated) DEVICE — GLOVE SZ 6.5 BIOGEL PI MICRO - PF LF (50PR/BX)

## (undated) DEVICE — GOWN SURGEONS X-LARGE - DISP. (30/CA)

## (undated) DEVICE — ELECTRODE DUAL RETURN W/ CORD - (50/PK)

## (undated) DEVICE — DERMABOND ADVANCED - (12EA/BX)

## (undated) DEVICE — SLEEVE VASO CALF MED - (10PR/CA)

## (undated) DEVICE — TRAY SRGPRP PVP IOD WT PRP - (20/CA)

## (undated) DEVICE — SENSOR SPO2 NEO LNCS ADHESIVE (20/BX) SEE USER NOTES

## (undated) DEVICE — TOWEL STOP TIMEOUT SAFETY FLAG (40EA/CA)

## (undated) DEVICE — TUBING INFLOW HYSTEROSCOPY (10EA/CA)

## (undated) DEVICE — PROTECTOR ULNA NERVE - (36PR/CA)

## (undated) DEVICE — UTERINE MANIP RUMI 6.7X8 - (5/BX)

## (undated) DEVICE — SUCTION INSTRUMENT YANKAUER BULBOUS TIP W/O VENT (50EA/CA)

## (undated) DEVICE — SUTURE 4-0 VICRYL PLUS FS-2 - 27 INCH (36/BX)

## (undated) DEVICE — NEEDLE INSFL 120MM 14GA VRRS - (20/BX)

## (undated) DEVICE — TROCAR Z THREAD 11 X 100 - BLADED (6/BX)

## (undated) DEVICE — GLOVE SZ 7 BIOGEL PI MICRO - PF LF (50PR/BX 4BX/CA)

## (undated) DEVICE — GOWN WARMING STANDARD FLEX - (30/CA)

## (undated) DEVICE — GLOVE BIOGEL SZ 9 SURGICAL PF LTX - (50/BX 4BX/CA)

## (undated) DEVICE — CATHETER IV 20 GA X 1-1/4 ---SURG.& SDS ONLY--- (50EA/BX)

## (undated) DEVICE — PAD SANITARY 11IN MAXI IND WRAPPED  (12EA/PK 24PK/CA)

## (undated) DEVICE — MANIFOLD NEPTUNE 1 PORT (20/PK)

## (undated) DEVICE — MASK ANESTHESIA ADULT  - (100/CA)

## (undated) DEVICE — NEEDLE NON SAFETY HYPO 22 GA X 1 1/2 IN (100/BX)

## (undated) DEVICE — SUTURE 2-0 VICRYL PLUS CT-2 - 27 INCH (36/BX)

## (undated) DEVICE — KIT ANESTHESIA W/CIRCUIT & 3/LT BAG W/FILTER (20EA/CA)

## (undated) DEVICE — ARMREST CRADLE FOAM - (2PR/PK 12PR/CA)

## (undated) DEVICE — TUBE E-T HI-LO CUFF 7.0MM (10EA/PK)

## (undated) DEVICE — TUBE CONNECTING SUCTION - CLEAR PLASTIC STERILE 72 IN (50EA/CA)

## (undated) DEVICE — SYRINGE NON SAFETY TB 1 CC 27 GA X 1/2 IN (100/BX 8BX/CA)

## (undated) DEVICE — HEAD HOLDER JUNIOR/ADULT

## (undated) DEVICE — KIT  I.V. START (100EA/CA)

## (undated) DEVICE — GLOVE BIOGEL SZ 8.5 SURGICAL PF LTX - (50PR/BX 4BX/CA)

## (undated) DEVICE — SET IRRIGATION CYSTOSCOPY TUBE L80 IN (20EA/CA)

## (undated) DEVICE — SET LEADWIRE 5 LEAD BEDSIDE DISPOSABLE ECG (1SET OF 5/EA)

## (undated) DEVICE — GLOVE BIOGEL PI INDICATOR SZ 6.5 SURGICAL PF LF - (50/BX 4BX/CA)

## (undated) DEVICE — MAT PATIENT POSITIONING PREVALON (10EA/CA)

## (undated) DEVICE — SUTURE 0 VICRYL PLUS CT-2 - 27 INCH (36/BX)

## (undated) DEVICE — SODIUM CHL IRRIGATION 0.9% 1000ML (12EA/CA)

## (undated) DEVICE — TROCAR 5X100 BLADED ADVANCE - FIXATION (6/BX)

## (undated) DEVICE — ADVANCED NOVASURE STERILE DEVICE (3EA/PK)

## (undated) DEVICE — PACK LAPAROSCOPY - (1/CA)

## (undated) DEVICE — WATER IRRIGATION STERILE 1000ML (12EA/CA)

## (undated) DEVICE — LUBRICANT INSTRUMENT 4 ML ELECTRO LUBE (20EA/BX)

## (undated) DEVICE — CHLORAPREP 26 ML APPLICATOR - ORANGE TINT(25/CA)

## (undated) DEVICE — CANISTER SUCTION 3000ML MECHANICAL FILTER AUTO SHUTOFF MEDI-VAC NONSTERILE LF DISP  (40EA/CA)